# Patient Record
Sex: FEMALE | Race: BLACK OR AFRICAN AMERICAN | NOT HISPANIC OR LATINO | Employment: FULL TIME | URBAN - METROPOLITAN AREA
[De-identification: names, ages, dates, MRNs, and addresses within clinical notes are randomized per-mention and may not be internally consistent; named-entity substitution may affect disease eponyms.]

---

## 2021-05-25 ENCOUNTER — RECORDS - HEALTHEAST (OUTPATIENT)
Dept: ADMINISTRATIVE | Facility: CLINIC | Age: 71
End: 2021-05-25

## 2021-06-01 ENCOUNTER — RECORDS - HEALTHEAST (OUTPATIENT)
Dept: ADMINISTRATIVE | Facility: CLINIC | Age: 71
End: 2021-06-01

## 2022-02-27 ENCOUNTER — APPOINTMENT (OUTPATIENT)
Dept: CT IMAGING | Facility: CLINIC | Age: 72
End: 2022-02-27
Attending: STUDENT IN AN ORGANIZED HEALTH CARE EDUCATION/TRAINING PROGRAM
Payer: COMMERCIAL

## 2022-02-27 ENCOUNTER — HOSPITAL ENCOUNTER (EMERGENCY)
Facility: CLINIC | Age: 72
Discharge: STILL ADMITTED OR ADMINISTRATIVE | End: 2022-02-27
Attending: STUDENT IN AN ORGANIZED HEALTH CARE EDUCATION/TRAINING PROGRAM | Admitting: STUDENT IN AN ORGANIZED HEALTH CARE EDUCATION/TRAINING PROGRAM
Payer: COMMERCIAL

## 2022-02-27 VITALS
DIASTOLIC BLOOD PRESSURE: 71 MMHG | SYSTOLIC BLOOD PRESSURE: 113 MMHG | OXYGEN SATURATION: 99 % | RESPIRATION RATE: 18 BRPM | WEIGHT: 181 LBS | TEMPERATURE: 97.7 F | HEART RATE: 68 BPM

## 2022-02-27 DIAGNOSIS — G81.91 HEMIPARESIS OF RIGHT DOMINANT SIDE, UNSPECIFIED HEMIPARESIS ETIOLOGY (H): ICD-10-CM

## 2022-02-27 LAB
ANION GAP SERPL CALCULATED.3IONS-SCNC: 14 MMOL/L (ref 5–18)
APTT PPP: 27 SECONDS (ref 22–38)
BASOPHILS # BLD AUTO: 0 10E3/UL (ref 0–0.2)
BASOPHILS NFR BLD AUTO: 0 %
BUN SERPL-MCNC: 17 MG/DL (ref 8–22)
CALCIUM SERPL-MCNC: 9.5 MG/DL (ref 8.5–10.5)
CHLORIDE BLD-SCNC: 105 MMOL/L (ref 98–107)
CO2 SERPL-SCNC: 22 MMOL/L (ref 22–31)
CREAT BLD-MCNC: 1 MG/DL (ref 0.6–1.1)
CREAT SERPL-MCNC: 0.86 MG/DL (ref 0.6–1.1)
EOSINOPHIL # BLD AUTO: 0.1 10E3/UL (ref 0–0.7)
EOSINOPHIL NFR BLD AUTO: 3 %
ERYTHROCYTE [DISTWIDTH] IN BLOOD BY AUTOMATED COUNT: 14 % (ref 10–15)
GFR SERPL CREATININE-BSD FRML MDRD: 73 ML/MIN/1.73M2
GFR SERPL CREATININE-BSD FRML MDRD: >60 ML/MIN/1.73M2
GLUCOSE BLD-MCNC: 94 MG/DL (ref 70–125)
GLUCOSE BLDC GLUCOMTR-MCNC: 91 MG/DL (ref 70–99)
HCT VFR BLD AUTO: 39.2 % (ref 35–47)
HGB BLD-MCNC: 12.5 G/DL (ref 11.7–15.7)
IMM GRANULOCYTES # BLD: 0 10E3/UL
IMM GRANULOCYTES NFR BLD: 0 %
INR PPP: 0.98 (ref 0.85–1.15)
LYMPHOCYTES # BLD AUTO: 2 10E3/UL (ref 0.8–5.3)
LYMPHOCYTES NFR BLD AUTO: 40 %
MCH RBC QN AUTO: 33.3 PG (ref 26.5–33)
MCHC RBC AUTO-ENTMCNC: 31.9 G/DL (ref 31.5–36.5)
MCV RBC AUTO: 105 FL (ref 78–100)
MONOCYTES # BLD AUTO: 0.7 10E3/UL (ref 0–1.3)
MONOCYTES NFR BLD AUTO: 15 %
NEUTROPHILS # BLD AUTO: 2 10E3/UL (ref 1.6–8.3)
NEUTROPHILS NFR BLD AUTO: 42 %
NRBC # BLD AUTO: 0 10E3/UL
NRBC BLD AUTO-RTO: 0 /100
PLATELET # BLD AUTO: 238 10E3/UL (ref 150–450)
POTASSIUM BLD-SCNC: 3.8 MMOL/L (ref 3.5–5)
RBC # BLD AUTO: 3.75 10E6/UL (ref 3.8–5.2)
SARS-COV-2 RNA RESP QL NAA+PROBE: NEGATIVE
SODIUM SERPL-SCNC: 141 MMOL/L (ref 136–145)
TROPONIN I SERPL-MCNC: <0.01 NG/ML (ref 0–0.29)
WBC # BLD AUTO: 4.9 10E3/UL (ref 4–11)

## 2022-02-27 PROCEDURE — 85004 AUTOMATED DIFF WBC COUNT: CPT | Performed by: STUDENT IN AN ORGANIZED HEALTH CARE EDUCATION/TRAINING PROGRAM

## 2022-02-27 PROCEDURE — 36415 COLL VENOUS BLD VENIPUNCTURE: CPT | Performed by: STUDENT IN AN ORGANIZED HEALTH CARE EDUCATION/TRAINING PROGRAM

## 2022-02-27 PROCEDURE — 99285 EMERGENCY DEPT VISIT HI MDM: CPT | Mod: 25

## 2022-02-27 PROCEDURE — 96374 THER/PROPH/DIAG INJ IV PUSH: CPT | Mod: 59

## 2022-02-27 PROCEDURE — 87635 SARS-COV-2 COVID-19 AMP PRB: CPT | Performed by: STUDENT IN AN ORGANIZED HEALTH CARE EDUCATION/TRAINING PROGRAM

## 2022-02-27 PROCEDURE — G0508 CRIT CARE TELEHEA CONSULT 60: HCPCS | Performed by: PSYCHIATRY & NEUROLOGY

## 2022-02-27 PROCEDURE — 250N000011 HC RX IP 250 OP 636: Performed by: PSYCHIATRY & NEUROLOGY

## 2022-02-27 PROCEDURE — 258N000003 HC RX IP 258 OP 636: Performed by: PSYCHIATRY & NEUROLOGY

## 2022-02-27 PROCEDURE — 85610 PROTHROMBIN TIME: CPT | Performed by: STUDENT IN AN ORGANIZED HEALTH CARE EDUCATION/TRAINING PROGRAM

## 2022-02-27 PROCEDURE — 80048 BASIC METABOLIC PNL TOTAL CA: CPT | Performed by: STUDENT IN AN ORGANIZED HEALTH CARE EDUCATION/TRAINING PROGRAM

## 2022-02-27 PROCEDURE — 250N000011 HC RX IP 250 OP 636: Performed by: STUDENT IN AN ORGANIZED HEALTH CARE EDUCATION/TRAINING PROGRAM

## 2022-02-27 PROCEDURE — 85730 THROMBOPLASTIN TIME PARTIAL: CPT | Performed by: STUDENT IN AN ORGANIZED HEALTH CARE EDUCATION/TRAINING PROGRAM

## 2022-02-27 PROCEDURE — 93005 ELECTROCARDIOGRAM TRACING: CPT | Performed by: STUDENT IN AN ORGANIZED HEALTH CARE EDUCATION/TRAINING PROGRAM

## 2022-02-27 PROCEDURE — 70496 CT ANGIOGRAPHY HEAD: CPT

## 2022-02-27 PROCEDURE — 82565 ASSAY OF CREATININE: CPT | Mod: 91

## 2022-02-27 PROCEDURE — 84484 ASSAY OF TROPONIN QUANT: CPT | Performed by: STUDENT IN AN ORGANIZED HEALTH CARE EDUCATION/TRAINING PROGRAM

## 2022-02-27 PROCEDURE — C9803 HOPD COVID-19 SPEC COLLECT: HCPCS

## 2022-02-27 PROCEDURE — 70498 CT ANGIOGRAPHY NECK: CPT

## 2022-02-27 RX ORDER — LISINOPRIL 20 MG/1
1 TABLET ORAL DAILY
COMMUNITY
Start: 2021-12-04

## 2022-02-27 RX ORDER — FEBUXOSTAT 40 MG/1
40 TABLET, FILM COATED ORAL DAILY
COMMUNITY
Start: 2021-11-11

## 2022-02-27 RX ORDER — LABETALOL HYDROCHLORIDE 5 MG/ML
10 INJECTION, SOLUTION INTRAVENOUS EVERY 10 MIN PRN
Status: DISCONTINUED | OUTPATIENT
Start: 2022-02-27 | End: 2022-02-28 | Stop reason: HOSPADM

## 2022-02-27 RX ORDER — SODIUM CHLORIDE 9 MG/ML
INJECTION, SOLUTION INTRAVENOUS CONTINUOUS PRN
Status: DISCONTINUED | OUTPATIENT
Start: 2022-02-27 | End: 2022-02-28 | Stop reason: HOSPADM

## 2022-02-27 RX ORDER — GABAPENTIN 300 MG/1
300 CAPSULE ORAL 3 TIMES DAILY
COMMUNITY
Start: 2022-02-16

## 2022-02-27 RX ORDER — ETHACRYNIC ACID 25 MG/1
2 TABLET ORAL DAILY
COMMUNITY
Start: 2022-01-03

## 2022-02-27 RX ORDER — IOPAMIDOL 755 MG/ML
100 INJECTION, SOLUTION INTRAVASCULAR ONCE
Status: COMPLETED | OUTPATIENT
Start: 2022-02-27 | End: 2022-02-27

## 2022-02-27 RX ORDER — COLCHICINE 0.6 MG/1
0.6 TABLET ORAL DAILY
COMMUNITY
Start: 2021-11-11

## 2022-02-27 RX ORDER — HYDRALAZINE HYDROCHLORIDE 20 MG/ML
10 INJECTION INTRAMUSCULAR; INTRAVENOUS EVERY 10 MIN PRN
Status: DISCONTINUED | OUTPATIENT
Start: 2022-02-27 | End: 2022-02-28 | Stop reason: HOSPADM

## 2022-02-27 RX ADMIN — SODIUM CHLORIDE 20 ML/HR: 9 INJECTION, SOLUTION INTRAVENOUS at 22:30

## 2022-02-27 RX ADMIN — Medication 20.5 MG: at 22:03

## 2022-02-27 RX ADMIN — IOPAMIDOL 100 ML: 755 INJECTION, SOLUTION INTRAVENOUS at 21:35

## 2022-02-28 NOTE — ED TRIAGE NOTES
Pt presents with sudden right arm weakness and tingling in right arm beginning one hour prior. Pt denies any other deficits.

## 2022-02-28 NOTE — ED NOTES
[facility]    Stroke Consult Note    Reason for Consult: Stroke Code     Chief Complaint: Extremity Weakness and Numbness      HPI  Connie Webster is a 71 year old female with PMHx of HTN, gout, multiple blood clots/PEs (provoked following surgery) not on AC who presents with sudden onset weakness of the distal right arm and hand.  The symptoms then progressed to involve the right leg.      NIHSS = 5 on exam with only 1 point for RUE drift, but severe weakness of the right hand.  Lytics were delayed slightly obtaining extensive history regarding blood clots, while ensuring she did not take any blood thinners and verifying the exact time of onset, which the patient did not initially recall.    Imaging Findings  CT/CTA normal without microvascular disease.  Azygous CARMEN    Thrombolytic Treatment   Administered. Risks (including potential for bleeding and death), benefits, and alternatives to thrombolytic therapy were discussed with Patient prior to administration.    Endovascular Treatment  Not initiated due to absence of proximal vessel occlusion    Impression   1. Acute ischemic stroke, suspect thalamocapsular lacunar infarct causing right hemiparesis and hemianesthesia  --her brain does not have have microvascular disease and she is hypo to normotensive making a stroke mimic also possible, however the acuity of onset and exam are most consistent with ischemic stroke.    Recommendations  1. TNK--ordered and discussed with ED physician  2. Will need transfer to an ICU capable of perfomring routine post lytic checks  3. Stroke work-up  --the stroke work-up for patients older than 60 includes an echo without a bubble study, however, given her extensive history of clotting it would be reasonable to add a bubble study to her TTE and check LE dopplers if positive    Patient Follow-up    Pending transfer--we will admit/follow if transferred to [facility] or [facility].    Thank you for this  "consult.     Isael Oliva MD, MS  Vascular Neurology  To page me or covering stroke neurology team member, click here: [facility]   Choose \"On Call\" tab at top, then search dropdown box for \"Neurology Adult\", select location, press Enter, then look for stroke/neuro ICU/telestroke.    ______________________________________________________    Clinically Significant Risk Factors Present on Admission              # Platelet Defect: home medication list includes an antiplatelet medication       Past Medical History   History reviewed. No pertinent past medical history.  Past Surgical History   History reviewed. No pertinent surgical history.  Medications   Home Meds  Prior to Admission medications    Medication Sig Start Date End Date Taking? Authorizing Provider   aspirin (ASA) 81 MG EC tablet Take 81 mg by mouth daily 8/5/20  Yes Unknown, Entered By History   colchicine (COLCYRS) 0.6 MG tablet Take 0.6 mg by mouth daily 11/11/21  Yes Unknown, Entered By History   ethacrynic acid (EDECRIN) 25 MG tablet Take 2 tablets by mouth daily 1/3/22  Yes Unknown, Entered By History   febuxostat (ULORIC) 40 MG TABS tablet Take 40 mg by mouth daily 11/11/21  Yes Unknown, Entered By History   gabapentin (NEURONTIN) 300 MG capsule Take 300 mg by mouth 3 times daily 2/16/22  Yes Unknown, Entered By History   lisinopril (ZESTRIL) 20 MG tablet Take 1 tablet by mouth daily 12/4/21  Yes Unknown, Entered By History       Scheduled Meds      Infusion Meds    niCARdipine       sodium chloride         PRN Meds  labetalol **OR** hydrALAZINE, niCARdipine, sodium chloride    Allergies   Allergies   Allergen Reactions     Sulfa Drugs      Family History   History reviewed. No pertinent family history.  Social History   Social History     Tobacco Use     Smoking status: None     Smokeless tobacco: None   Substance Use Topics     Alcohol use: None     Drug use: None       Review of Systems   The 10 point Review of Systems is negative other than " noted in the HPI or here.        PHYSICAL EXAMINATION  Pulse:  [66-73] 66  Resp:  [16-26] 24  BP: (105-121)/(56-71) 114/69  SpO2:  [97 %-99 %] 99 %     Neuro:  Mental status: Awake, alert, attentive, oriented x3. Speech is fluent, comprehension and repetition intact. No dysarthria.  Good historian.  No neglect.  Cranial nerves: EOMI, visual fields full, right facial weakness, facial sensation intact, shoulder shrug strong, tongue/uvula midline, hearing intact to conversation.  Motor: 5/5 strength on left.  Patient has drift in RUE, but does not touch the bed, flaccid distally.  RLE has considerable drift, but does not reach the bed  Sensory: Intact to light touch in face, arms, and legs on left, intact on face bilaterally, nearly absent sensation in RUE/RLE  Coordination: Finger to nose intact bilaterally without dysmetria.  Normal heel-shin test bilaterally  Gait: Deferred    Dysphagia Screen  Per Nursing    Stroke Scales    NIHSS  Interval baseline (02/27/22 2209)   Interval Comments     1a. Level of Consciousness 0-->Alert, keenly responsive   1b. LOC Questions 0-->Answers both questions correctly   1c. LOC Commands 0-->Performs both tasks correctly   2.   Best Gaze 0-->Normal   3.   Visual 0-->No visual loss   4.   Facial Palsy 1-->Minor paralysis (flattened nasolabial fold, asymmetry on smiling)   5a. Motor Arm, Left 0-->No drift, limb holds 90 (or 45) degrees for full 10 secs   5b. Motor Arm, Right 1-->Drift, limb holds 90 (or 45) degrees, but drifts down before full 10 secs, does not hit bed or other support   6a. Motor Leg, Left 0-->No drift, leg holds 30 degree position for full 5 secs   6b. Motor Leg, right 1-->Drift, leg falls by the end of the 5-sec period but does not hit bed   7.   Limb Ataxia 0-->Absent   8.   Sensory 2-->Severe to total sensory loss, patient is not aware of being touched in the face, arm, and leg   9.   Best Language 0-->No aphasia, normal   10. Dysarthria 0-->Normal   11. Extinction  and Inattention  0-->No abnormality   Total 5 (02/27/22 2209)       Modified [place] Score (Pre-morbid)  0-No deficits    Imaging  I personally reviewed all imaging; relevant findings per HPI.     Lab Results Data   CBC  Recent Labs   Lab 02/27/22 2122   WBC 4.9   RBC 3.75*   HGB 12.5   HCT 39.2        Basic Metabolic Panel    Recent Labs   Lab 02/27/22 2126 02/27/22 2122 02/27/22 2117   NA  --  141  --    POTASSIUM  --  3.8  --    CHLORIDE  --  105  --    CO2  --  22  --    BUN  --  17  --    CR 1.0 0.86  --    GLC  --  94 91   ROGER  --  9.5  --      Liver Panel  No results for input(s): PROTTOTAL, ALBUMIN, BILITOTAL, ALKPHOS, AST, ALT, BILIDIRECT in the last 168 hours.  INR    Recent Labs   Lab Test 02/27/22 2122   INR 0.98      Lipid Profile  No lab results found.  A1C  No lab results found.  Troponin I  No results for input(s): TROPONIN, GHTROP in the last 168 hours.       Stroke Code Data Data   Stroke Code Data  (for stroke code with tele)  Stroke code activated 02/27/22 2117   First stroke provider response 02/27/22 2119   Video start time         Video end time         Last known normal 02/27/22 1945   Time of discovery  (or onset of symptoms)  02/27/22 1945   Head CT read by Stroke Neuro Dr/Provider 02/27/22 2127   Was stroke code de-escalated? No               Telestroke Service Details  Type of service telemedicine diagnostic assessment of acute neurological changes   Reason telemedicine is appropriate patient requires assessment with a specialist for diagnosis and treatment of neurological symptoms   Mode of transmission secure interactive audio and video communication per Francesco   Originating site (patient location) [facility]    Distant site (provider location) Provider remote site       I saw and evaluated Connie Webster on Feb 27, 2022 as a STROKE CODE activation.  Connie Webster is in critical condition due to acute ischemic stroke s/p treatment with  intravenous thrombolysis; she is at high risk of further neurologic deterioration secondary to her acute ischemic stroke with potential for hemorrhagic transformation and reperfusion injury following thrombolysis.  I reviewed all laboratory data and neuroimaging studies.  I personally spent 60 minutes of critical care decision making time managing the above conditions.  I agree with the remainder of the assessment and plan of care documented below.

## 2022-02-28 NOTE — ED PROVIDER NOTES
Emergency Department Encounter         FINAL IMPRESSION:  WEAKNESS        ED COURSE AND MEDICAL DECISION MAKING       ED Course as of 02/28/22 0002   Sun Feb 27, 2022 2116 Patient is a 71-year-old female history of hypertension and gout, here from home with 1 hour duration of right-sided weakness.  Patient states she was sitting reading when she noticed that her right hand could not do what she wanted to.  Then noticed the right leg was also weak.  Stated that her right arm felt mildly tingly at the same time.  No vision issues.  States now this time she started noticing some right facial symptoms.  Does not seem confused.  Has been doing well this week otherwise.  No blood thinners.  Discussed case with on-call stroke neuro who states that the story is concerning and states that he will most likely proceed with the hyperacute MRI.  He states he will order this MRI.   2212 EKG is sinus rhythm of 70, no signs acute ischemia, no inversions no depressions no STEMI criteria QTC is 453, no old EKGs for comparison.   -Stroke neurologist was able to help with the situation and talk with patient about TPA    9:12 PM I met with the patient, obtained history, performed an initial exam, and discussed options and plan for diagnostics and treatment here in the ED. PPE worn including surgical mask, surgical gloves.  9:19 PM I spoke with the stroke code neurologist, Dr. Oliva.  9:33 PM Radiology reports negative head CT. CTA pending and will clear the patient for MR imaging.  9:46 PM Negative CTA.  10:05 PM I rechecked the patient and spoke with the stroke code neurologist via the tele-stroke service. Patient received tPA. Patient is requesting transfer to a different hospital as her insurance is through [employer].  10:18 PM I spoke with the [facility] intensivist Dr. Ruano.  10:35 PM I spoke with the Dr. Aranda, Neurologist from [facility].    Critical Care     Performed by: Luis Enrique Sullivan  Authorized by: Luis Enrique Sullivan  Total critical care  time: 60 minutes  Critical care was necessary to treat or prevent imminent or life-threatening deterioration of the following conditions: STROKE, TPA  Critical care was time spent personally by me on the following activities: development of treatment plan with patient or surrogate, discussions with consultants, examination of patient, evaluation of patient's response to treatment, obtaining history from patient or surrogate, ordering and performing treatments and interventions, ordering and review of laboratory studies, ordering and review of radiographic studies, re-evaluation of patient's condition and monitoring for potential decompensation.  Critical care time was exclusive of separately billable procedures and treating other patients.        At the conclusion of the encounter I discussed the results of all the tests and the disposition. The questions were answered. The patient or family acknowledged understanding and was agreeable with the care plan.                  MEDICATIONS GIVEN IN THE EMERGENCY DEPARTMENT:  Medications   sodium chloride 0.9% infusion (20 mL/hr Intravenous New Bag 2/27/22 2230)   labetalol (NORMODYNE/TRANDATE) injection 10 mg (has no administration in time range)     Or   hydrALAZINE (APRESOLINE) injection 10 mg (has no administration in time range)   niCARdipine 40 mg in 200 mL 0.83% NaCl (CARDENE) infusion (has no administration in time range)   iopamidol (ISOVUE-370) solution 100 mL (100 mLs Intravenous Given 2/27/22 2135)   tenecteplase (TNKase) injection 20.5 mg (20.5 mg Intravenous Given 2/27/22 2203)       NEW PRESCRIPTIONS STARTED AT TODAY'S ED VISIT:  New Prescriptions    No medications on file       HPI     Patient information obtained from: Patient    Use of Interpretor: N/A    Connie Webster is a 71 year old female with a pertinent history of hypertension who presents to this ED by walk in for evaluation of right sided weakness. Patient reports she was reading in her chair  "and reading 1 prior to the ED encounter (~8:00 PM) and developed right sided hand weakness. She states her hand went somewhat limp and since then she has been unable to lift up her hand. Patient states her hand \"doesn't do what it's supposed to do\" when compared to her left hand. She also reports associated right leg weakness and some paraesthesias of her right hand.    She denies any current blood thinner use, but states she has used them in the past. She is currently on antihypertensive medication.    Denies headache, visual changes, neck pain, fever, chills, nausea, vomiting.        REVIEW OF SYSTEMS:  Review of Systems   Constitutional: Negative for fever, malaise  HEENT: Negative runny nose, sore throat, ear pain, neck pain, visual changes  Respiratory: Negative for shortness of breath, cough, congestion  Cardiovascular: Negative for chest pain, leg edema  Gastrointestinal: Negative for abdominal distention, abdominal pain, constipation, vomiting, nausea, diarrhea  Genitourinary: Negative for dysuria and hematuria.   Integument: Negative for rash, skin breakdown  Neurological: Positive for focal weakness (right hand, right leg), paraesthesias (right hand). Negative for headache.  Musculoskeletal: Negative for joint pain, joint swelling      All other systems reviewed and are negative.          MEDICAL HISTORY     History reviewed. No pertinent past medical history.    History reviewed. No pertinent surgical history.    Social History     Tobacco Use     Smoking status: None     Smokeless tobacco: None   Substance Use Topics     Alcohol use: None     Drug use: None       aspirin (ASA) 81 MG EC tablet  colchicine (COLCYRS) 0.6 MG tablet  ethacrynic acid (EDECRIN) 25 MG tablet  febuxostat (ULORIC) 40 MG TABS tablet  gabapentin (NEURONTIN) 300 MG capsule  lisinopril (ZESTRIL) 20 MG tablet            PHYSICAL EXAM     /71   Pulse 68   Temp 97.7  F (36.5  C) (Oral)   Resp 18   Wt 82.1 kg (181 lb)   SpO2 " 99%       PHYSICAL EXAM:     General: Patient appears well, nontoxic, comfortable  HEENT: Moist mucous membranes, no tongue swelling.  No head trauma.  No midline neck pain.  Cardiovascular: Normal rate, normal rhythm, no extremity edema.  No appreciable murmur.  Respiratory: No signs of respiratory distress, lungs are clear to auscultation bilaterally with no wheezes rhonchi or rales.  Abdominal: Soft, nontender, nondistended, no palpable masses, no guarding, no rebound  Musculoskeletal: Full range of motion of joints, no deformities appreciated.  Neurological: Patient with a right arm weakness as well as right leg weakness.  Subtle right facial droop.  NIH of 8  Psychological: Normal affect and mood.  Integument: No rashes appreciated          RESULTS       Labs Ordered and Resulted from Time of ED Arrival to Time of ED Departure   CBC WITH PLATELETS AND DIFFERENTIAL - Abnormal       Result Value    WBC Count 4.9      RBC Count 3.75 (*)     Hemoglobin 12.5      Hematocrit 39.2       (*)     MCH 33.3 (*)     MCHC 31.9      RDW 14.0      Platelet Count 238      % Neutrophils 42      % Lymphocytes 40      % Monocytes 15      % Eosinophils 3      % Basophils 0      % Immature Granulocytes 0      NRBCs per 100 WBC 0      Absolute Neutrophils 2.0      Absolute Lymphocytes 2.0      Absolute Monocytes 0.7      Absolute Eosinophils 0.1      Absolute Basophils 0.0      Absolute Immature Granulocytes 0.0      Absolute NRBCs 0.0     BASIC METABOLIC PANEL - Normal    Sodium 141      Potassium 3.8      Chloride 105      Carbon Dioxide (CO2) 22      Anion Gap 14      Urea Nitrogen 17      Creatinine 0.86      Calcium 9.5      Glucose 94      GFR Estimate 73     INR - Normal    INR 0.98     PARTIAL THROMBOPLASTIN TIME - Normal    aPTT 27     TROPONIN I - Normal    Troponin I <0.01     GLUCOSE BY METER - Normal    GLUCOSE BY METER POCT 91     ISTAT CREATININE POCT - Normal    Creatinine POCT 1.0      GFR, ESTIMATED POCT >60      COVID-19 VIRUS (CORONAVIRUS) BY PCR - Normal    SARS CoV2 PCR Negative     GLUCOSE MONITOR NURSING POCT       CTA Head Neck with Contrast   Final Result   IMPRESSION:    HEAD CT:   1.  No CT finding of a mass, hemorrhage and focal area suggestive of acute infarct.   2.  Minimal age related changes.      HEAD CTA:    1.  No discrete vessel occlusion, significant stenosis, aneurysm or high flow vascular malformation involving the arteries of the [place].      NECK CTA:   1.  Normal configuration of the great vessels off the aortic arch with no significant stenosis of their origins.   2.  No significant stenosis or irregularity involving the arteries of the neck by NASCET criteria.   3.  No radiographic evidence of dissection.      Head CT findings were communicated to Dr. Sullivan at 9:33 pm and CTA findings were communicated to Dr. Sullivan at 9:46 PM on 02/27/2022.                        PROCEDURES:  Procedures:  Procedures       I, Oh Garnica am serving as a scribe to document services personally performed by Luis Enrique Sullivan DO, based on my observations and the provider's statements to me.  I, Luis Enrique Sullivan DO, attest that Oh Garnica is acting in a scribe capacity, has observed my performance of the services and has documented them in accordance with my direction.    Luis Enrique Sullivan DO  Emergency Medicine  [facility] EMERGENCY ROOM     Luis Enrique Sullivan DO  02/28/22 0230

## 2022-03-09 LAB
ATRIAL RATE - MUSE: 70 BPM
DIASTOLIC BLOOD PRESSURE - MUSE: 69 MMHG
INTERPRETATION ECG - MUSE: NORMAL
P AXIS - MUSE: 59 DEGREES
PR INTERVAL - MUSE: 140 MS
QRS DURATION - MUSE: 80 MS
QT - MUSE: 420 MS
QTC - MUSE: 453 MS
R AXIS - MUSE: 0 DEGREES
SYSTOLIC BLOOD PRESSURE - MUSE: 114 MMHG
T AXIS - MUSE: 4 DEGREES
VENTRICULAR RATE- MUSE: 70 BPM

## 2023-07-17 ENCOUNTER — HOSPITAL ENCOUNTER (EMERGENCY)
Facility: CLINIC | Age: 73
Discharge: HOME | End: 2023-07-17
Attending: EMERGENCY MEDICINE | Admitting: EMERGENCY MEDICINE
Payer: COMMERCIAL

## 2023-07-17 ENCOUNTER — APPOINTMENT (OUTPATIENT)
Dept: CT IMAGING | Facility: CLINIC | Age: 73
End: 2023-07-17
Attending: EMERGENCY MEDICINE
Payer: COMMERCIAL

## 2023-07-17 VITALS
OXYGEN SATURATION: 100 % | DIASTOLIC BLOOD PRESSURE: 82 MMHG | HEIGHT: 65 IN | BODY MASS INDEX: 29.16 KG/M2 | SYSTOLIC BLOOD PRESSURE: 115 MMHG | TEMPERATURE: 97.9 F | HEART RATE: 66 BPM | WEIGHT: 175 LBS | RESPIRATION RATE: 16 BRPM

## 2023-07-17 DIAGNOSIS — S70.01XA HEMATOMA OF RIGHT HIP, INITIAL ENCOUNTER: ICD-10-CM

## 2023-07-17 DIAGNOSIS — S00.01XA ABRASION, SCALP W/O INFECTION: ICD-10-CM

## 2023-07-17 DIAGNOSIS — S09.90XA CLOSED HEAD INJURY, INITIAL ENCOUNTER: ICD-10-CM

## 2023-07-17 PROBLEM — I10 PRIMARY HYPERTENSION: Status: ACTIVE | Noted: 2023-07-17

## 2023-07-17 PROBLEM — I82.409 DEEP VEIN THROMBOSIS (DVT) (H): Status: ACTIVE | Noted: 2023-07-17

## 2023-07-17 PROBLEM — Z86.711 HISTORY OF PULMONARY EMBOLISM: Status: ACTIVE | Noted: 2023-07-17

## 2023-07-17 PROBLEM — I63.9 CVA (CEREBRAL VASCULAR ACCIDENT) (H): Status: ACTIVE | Noted: 2022-02-28

## 2023-07-17 PROCEDURE — 70450 CT HEAD/BRAIN W/O DYE: CPT | Mod: ME

## 2023-07-17 PROCEDURE — 99284 EMERGENCY DEPT VISIT MOD MDM: CPT | Mod: 25

## 2023-07-18 NOTE — ED TRIAGE NOTES
Pt fell at home last night, striking R hip and forehead. Pt has pain and swelling to R hip and has been feeling dizzy today.      Triage Assessment     Row Name 07/17/23 2002       Triage Assessment (Adult)    Airway WDL WDL       Respiratory WDL    Respiratory WDL WDL       Skin Circulation/Temperature WDL    Skin Circulation/Temperature WDL WDL       Cardiac WDL    Cardiac WDL WDL       Peripheral/Neurovascular WDL    Peripheral Neurovascular WDL WDL       Cognitive/Neuro/Behavioral WDL    Cognitive/Neuro/Behavioral WDL WDL

## 2023-07-18 NOTE — ED PROVIDER NOTES
EMERGENCY DEPARTMENT ENCOUNTER      NAME: Connie Webster  AGE: 73 year old female  YOB: 1950  MRN: [id number]  EVALUATION DATE & TIME: No admission date for patient encounter.    PCP: Page Britton    ED PROVIDER: Zuleika Snider MD    Chief Complaint   Patient presents with     Fall     Head Injury         FINAL IMPRESSION:  1. Closed head injury, initial encounter    2. Abrasion, scalp w/o infection    3. Hematoma of right hip, initial encounter          ED COURSE & MEDICAL DECISION MAKING:    Pertinent Labs & Imaging studies reviewed. (See chart for details)  73 year old female with history of HTN, previous DVT/PE and CVA on Plavix with baseline depth perception issues after her stroke who presents to the Emergency Department for evaluation of fall yesterday that she describes as mechanical.  She did strike her forehead and hit her right hip.  Presents to the ED delayed today after prompting from her son.  Really no headache, LOC, nausea or vomiting.  Given her antiplatelet agent discussed neuroimaging which patient is agreeable to.  Obtained to rule out intracranial hemorrhage, and less likely skull fracture based on exam and mechanism.  CT head unremarkable.  She also complains of pain to the right hip.  On examination she has a hematoma to the lateral hip.  She has been ambulatory and did a 30-minute walk on her treadmill this morning without difficulty and I do not think warrants any imaging for fracture.  Reassured counseled and discharged home with return precautions.      ED Course as of 07/17/23 9845   Mon Jul 17, 2023 2109 Unable to view images in PACS due to IT issue, so not able to independently interpret images        8:06 PM  I met the patient and performed my initial interview and exam.  9:14 PM I updated the patient and discussed discharge.      Medical Decision Making    History:    Supplemental history from: N/A    External Record(s) reviewed: Urgent care telehealth visit  referred to ED today    Work Up:    Chart documentation includes differential considered and any EKGs or imaging independently interpreted by provider, see MDM    In additional to work up documented, I considered the following work up: See MDM    External consultation:    Discussion of management with another provider: N/A    Complicating factors:    Care impacted by chronic illness: HTN, CVA, DVT PE    Care affected by social determinants of health: Access to care    Disposition considerations: Discharge. No recommendations on prescription strength medication(s). See documentation for any additional details.        At the conclusion of the encounter I discussed the results of all of the tests and the disposition. The questions were answered. The patient or family acknowledged understanding and was agreeable with the care plan.      MEDICATIONS GIVEN IN THE EMERGENCY:  Medications - No data to display    NEW PRESCRIPTIONS STARTED AT TODAY'S ER VISIT  Discharge Medication List as of 7/17/2023  9:22 PM             =================================================================    HPI    Patient information was obtained from: patient    Use of Intrepreter: N/A        Connie Webster is a 73 year old female with pertinent medical history of a stroke in March 2022 who presents to the ED by walk in for evaluation of a fall and head injury.    The patient fell at home last night (7/16), hitting her right hip and forehead. Since the patient's stroke in March 2022, the patient has been becoming more and more off-balance. She endorses right hip pain and dizziness since the fall. She has had physical therapy since her stroke but still has some depth perception issues. Today (7/17), the patient was able to walk 30 minutes on a treadmill without difficulty.       PAST MEDICAL HISTORY:  History reviewed. No pertinent past medical history.    PAST SURGICAL HISTORY:  History reviewed. No pertinent surgical history.    CURRENT  "MEDICATIONS:    Prior to Admission Medications   Prescriptions Last Dose Informant Patient Reported? Taking?   aspirin (ASA) 81 MG EC tablet   Yes No   Sig: Take 81 mg by mouth daily (with dinner)    colchicine (COLCYRS) 0.6 MG tablet   Yes No   Sig: Take 0.6 mg by mouth daily   ethacrynic acid (EDECRIN) 25 MG tablet   Yes No   Sig: Take 2 tablets by mouth daily   febuxostat (ULORIC) 40 MG TABS tablet   Yes No   Sig: Take 40 mg by mouth daily   gabapentin (NEURONTIN) 300 MG capsule   Yes No   Sig: Take 300 mg by mouth 3 times daily   lisinopril (ZESTRIL) 20 MG tablet   Yes No   Sig: Take 1 tablet by mouth daily      Facility-Administered Medications: None       ALLERGIES:  Allergies   Allergen Reactions     Spironolactone      hyponatremia/hyperkalemia March 2011     Triamterene      hypokalemia/hypernatremia March 2011     Allopurinol Other (See Comments)     Hair loss ?     Furosemide Unknown     Morphine Itching     Erythromycin Itching, Rash and GI Disturbance     Sulfa Antibiotics Rash       FAMILY HISTORY:  History reviewed. No pertinent family history.    SOCIAL HISTORY:        VITALS:  Patient Vitals for the past 24 hrs:   BP Temp Temp src Pulse Resp SpO2 Height Weight   07/17/23 2118 115/82 97.9  F (36.6  C) Oral 66 16 100 % -- --   07/17/23 2004 -- -- -- -- -- -- 1.651 m (5' 5\") 79.4 kg (175 lb)   07/17/23 2001 (!) 152/91 (!) 96.5  F (35.8  C) Temporal 86 18 96 % -- --       PHYSICAL EXAM    General Appearance: Well-appearing, well-nourished, no acute distress   Head:  Normocephalic, atraumatic. Abrasion to forehead.  Eyes:  PERRL, conjunctiva/corneas clear, EOM's intact  ENT:  membranes are moist without pallor  Neck:  Supple, no midline tenderness to palpation  Cardio:  Regular rate and rhythm  Pulm:  No respiratory distress  Back:  No midline tenderness to palpation, no paraspinal tenderness  Extremities:  Extremities normal, atraumatic, no cyanosis or edema, full ROM and motor tone intact, bilateral " pulses intact upper and lower. 5/5 lower and upper extremities intact.  Skin:  Skin warm, dry, no rashes. Hematoma to right lateral hip.   Neuro:  Alert and oriented ×3, moving all extremities, no gross sensory defects. Cranial nerves 3-15 intact.      RADIOLOGY/LABS:  Reviewed all pertinent imaging. Please see official radiology report. All pertinent labs reviewed and interpreted.    Results for orders placed or performed during the hospital encounter of 07/17/23   Head CT w/o contrast    Impression    IMPRESSION:  1.  No evidence of acute intracranial hemorrhage.  2.  No evidence of acute calvarial fracture.  3.  Age-related change.         The creation of this record is based on the scribe s observations of the work being performed by Zuleika Snider MD and the provider s statements to them. It was created on her behalf by Lex Rivas, a trained medical scribe. This document has been checked and approved by the attending provider.    Zuleika Snider MD  Emergency Medicine  [facility] EMERGENCY ROOM  [address] [facility] [id number] [phone]  Dept: [phone]     Zuleika Snider MD  07/17/23 0303

## 2024-02-12 ENCOUNTER — HOSPITAL ENCOUNTER (EMERGENCY)
Facility: HOSPITAL | Age: 74
Discharge: STILL ADMITTED OR ADMINISTRATIVE | End: 2024-02-12
Payer: COMMERCIAL